# Patient Record
Sex: MALE | Race: WHITE | NOT HISPANIC OR LATINO | URBAN - METROPOLITAN AREA
[De-identification: names, ages, dates, MRNs, and addresses within clinical notes are randomized per-mention and may not be internally consistent; named-entity substitution may affect disease eponyms.]

---

## 2021-04-07 ENCOUNTER — PREP FOR SURGERY (OUTPATIENT)
Dept: OTHER | Facility: HOSPITAL | Age: 51
End: 2021-04-07

## 2021-04-07 ENCOUNTER — OFFICE VISIT (OUTPATIENT)
Dept: ORTHOPEDIC SURGERY | Facility: CLINIC | Age: 51
End: 2021-04-07

## 2021-04-07 VITALS
WEIGHT: 244 LBS | DIASTOLIC BLOOD PRESSURE: 89 MMHG | SYSTOLIC BLOOD PRESSURE: 146 MMHG | HEART RATE: 66 BPM | HEIGHT: 72 IN | BODY MASS INDEX: 33.05 KG/M2

## 2021-04-07 DIAGNOSIS — M25.511 ACUTE PAIN OF RIGHT SHOULDER: Primary | ICD-10-CM

## 2021-04-07 DIAGNOSIS — M19.011 OSTEOARTHRITIS OF RIGHT GLENOHUMERAL JOINT: ICD-10-CM

## 2021-04-07 DIAGNOSIS — M19.011 OSTEOARTHRITIS OF RIGHT GLENOHUMERAL JOINT: Primary | ICD-10-CM

## 2021-04-07 PROCEDURE — 99244 OFF/OP CNSLTJ NEW/EST MOD 40: CPT | Performed by: ORTHOPAEDIC SURGERY

## 2021-04-07 RX ORDER — ALLOPURINOL 100 MG/1
200 TABLET ORAL DAILY
COMMUNITY
Start: 2021-03-05

## 2021-04-07 RX ORDER — TRANEXAMIC ACID 10 MG/ML
1000 INJECTION, SOLUTION INTRAVENOUS ONCE
Status: CANCELLED | OUTPATIENT
Start: 2021-04-07 | End: 2021-04-07

## 2021-04-07 RX ORDER — OXYCODONE HCL 10 MG/1
10 TABLET, FILM COATED, EXTENDED RELEASE ORAL ONCE
Status: CANCELLED | OUTPATIENT
Start: 2021-04-07 | End: 2021-04-07

## 2021-04-07 RX ORDER — MELOXICAM 15 MG/1
15 TABLET ORAL ONCE
Status: CANCELLED | OUTPATIENT
Start: 2021-04-07 | End: 2021-04-07

## 2021-04-07 RX ORDER — PREGABALIN 75 MG/1
150 CAPSULE ORAL ONCE
Status: CANCELLED | OUTPATIENT
Start: 2021-04-07 | End: 2021-04-07

## 2021-04-07 NOTE — PROGRESS NOTES
"     Patient ID: Leonard López is a 50 y.o. male.    Chief Complaint:    Chief Complaint   Patient presents with   • Right Shoulder - Initial Evaluation       HPI:  Leonard is a 50-year-old gentleman here with longstanding right shoulder pain in consultation from Dr. Castellon.  He had surgery many years ago but does not really recall what was done.  Over the last several years has had worsening problems in the shoulder with popping and grinding and feeling like the shoulder is unstable.  He cannot lift overhead or out away from his body.  Pain is sharp and a 7/10  History reviewed. No pertinent past medical history.    Past Surgical History:   Procedure Laterality Date   • SHOULDER SURGERY Right        History reviewed. No pertinent family history.       Social History     Occupational History   • Not on file   Tobacco Use   • Smoking status: Never Smoker   Substance and Sexual Activity   • Alcohol use: Yes   • Drug use: Not on file   • Sexual activity: Not on file      Review of Systems   Cardiovascular: Negative for chest pain.   Musculoskeletal: Positive for arthralgias.       Objective:    /89   Pulse 66   Ht 182.9 cm (72\")   Wt 111 kg (244 lb)   BMI 33.09 kg/m²     Physical Examination:  Right shoulder demonstrates healed incisions from previous mini open surgery/arthroscopic surgery.  Mild supraspinatus atrophy noted.  Deltoid function is intact but active elevation is 110 degrees.  Passive elevation 150 degrees abduction 130 degrees external [place] 40 degrees internal rotation S1 with pain weakness on Speed, [place], supraspinatus testing.  Belly press and liftoff are 5/5 and 3/5.  Sensory and motor exam are intact all distributions. Radial pulse is palpable and capillary refill is less than two seconds to all digits    Imaging:  right Shoulder X-Ray  Indication: Chronic right shoulder pain  AP Y and Lateral views  Findings: Moderate joint space narrowing without significant glenoid abnormality  no bony " lesion  Soft tissues normal  decreased joint spaces  Hardware appropriately positioned not applicable      no prior studies available for comparison.    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  In light of his degenerative changes unfortunate I do not believe a soft tissue surgery such as a superior capsular reconstruction is indicated.  His cuff repair itself is also massive and irreparable.  Unfortunately definitive options would likely involve only reverse total shoulder arthroplasty and I discussed the main issue being his age.  However he has significant symptoms and I discussed the risk and benefits of long-term outcomes and he would like to proceed at this point.  I discussed the risks including bleeding, scar, infection, stiffness, nerve artery vein and tendon damage, instability, dvt, loss of life or limb. Issues of scapular notching and component revision were also discussed. All questions were answered and addressed. Rehabilitation restrictions and timeframes were discussed. Use of cement or cementless fixation was discussed.      Procedures         Disclaimer: Please note that areas of this note were completed with computer voice recognition software.  Quite often unanticipated grammatical, syntax, homophones, and other interpretive errors are inadvertently transcribed by the computer software. Please excuse any errors that have escaped final proofreading.

## 2021-07-08 ENCOUNTER — OFFICE VISIT (OUTPATIENT)
Dept: ORTHOPEDIC SURGERY | Facility: CLINIC | Age: 51
End: 2021-07-08

## 2021-07-08 VITALS
BODY MASS INDEX: 33.05 KG/M2 | DIASTOLIC BLOOD PRESSURE: 97 MMHG | SYSTOLIC BLOOD PRESSURE: 156 MMHG | HEART RATE: 66 BPM | WEIGHT: 244 LBS | HEIGHT: 72 IN

## 2021-07-08 DIAGNOSIS — M25.512 ACUTE PAIN OF LEFT SHOULDER: Primary | ICD-10-CM

## 2021-07-08 PROCEDURE — 99213 OFFICE O/P EST LOW 20 MIN: CPT | Performed by: ORTHOPAEDIC SURGERY

## 2021-07-08 NOTE — PROGRESS NOTES
"     Patient ID: Leonard López is a 50 y.o. male.    Chief Complaint:    Chief Complaint   Patient presents with   • Left Shoulder - Initial Evaluation       HPI:  Leonard is a 50-year-old gentleman here for left shoulder pain for about a year.  Began after swatting a fly when he felt a pop in his shoulder.  Since then he has had pain over the bicep groove and impingement area with weakness lifting overhead and at night.  He has done some rehab with therapy and oral medication and ice with activity restriction without relief  History reviewed. No pertinent past medical history.    Past Surgical History:   Procedure Laterality Date   • SHOULDER SURGERY Right        History reviewed. No pertinent family history.       Social History     Occupational History   • Not on file   Tobacco Use   • Smoking status: Never Smoker   Substance and Sexual Activity   • Alcohol use: Yes   • Drug use: Not on file   • Sexual activity: Not on file      Review of Systems   Cardiovascular: Negative for chest pain.   Musculoskeletal: Positive for arthralgias.       Objective:    /97   Pulse 66   Ht 182.9 cm (72\")   Wt 111 kg (244 lb)   BMI 33.09 kg/m²     Physical Examination:  Left shoulder demonstrates intact skin with mild pain over the bicep groove and impingement area.  Passive elevation 170 abduction 140 external rotation 50 internal rotation is S1.  He has pain weakness on Speed and [place] testing.  Supraspinatus negative.  Belly press and liftoff are painful but 5/5  Sensory and motor exam are intact all distributions. Radial pulse is palpable and capillary refill is less than two seconds to all digits    Imaging:  left Shoulder X-Ray  Indication: Left shoulder pain for a year  AP Y and Lateral views  Findings: Well-maintained joint spaces with sclerosis of the greater tuberosity  no bony lesion  Soft tissues normal  normal joint spaces  Hardware appropriately positioned not applicable      no prior studies available for " comparison.    Assessment:  Left shoulder pain possible cuff tear    Plan:  I recommend MRI to evaluate his rotator cuff      Procedures         Disclaimer: Please note that areas of this note were completed with computer voice recognition software.  Quite often unanticipated grammatical, syntax, homophones, and other interpretive errors are inadvertently transcribed by the computer software. Please excuse any errors that have escaped final proofreading.

## 2021-07-08 NOTE — PATIENT INSTRUCTIONS
MRI follow-up instructions    Today at your office visit, Dr. Henry recommended an MRI (magnetic resonance imaging) to evaluate your joint pain.  This requires a precertification process, which our office will do, and then we will contact you when it is approved and go over scheduling options.  We typically recommend these to be performed at [facility] or [facility].  If for some reason it is performed elsewhere please arrange to have that facility give you a disc with your images on it so Dr. Henry can review it at your follow-up visit.    When checking out today we recommend making an appointment to go over your results in approximately two weeks.  If your MRI is done sooner than that we would be happy to schedule you sooner to go over your results, just contact us at [phone] or through the [employer] portal to let us know your MRI is completed.  Seeing you in person for the results gives us the best opportunity to look at your images together and explain the diagnosis and treatment options to best help you.

## 2021-07-27 ENCOUNTER — APPOINTMENT (OUTPATIENT)
Dept: MRI IMAGING | Facility: HOSPITAL | Age: 51
End: 2021-07-27